# Patient Record
Sex: FEMALE | Race: WHITE | NOT HISPANIC OR LATINO | URBAN - METROPOLITAN AREA
[De-identification: names, ages, dates, MRNs, and addresses within clinical notes are randomized per-mention and may not be internally consistent; named-entity substitution may affect disease eponyms.]

---

## 2020-07-25 ENCOUNTER — TELEPHONE ENCOUNTER (OUTPATIENT)
Dept: URBAN - METROPOLITAN AREA CLINIC 13 | Facility: CLINIC | Age: 55
End: 2020-07-25

## 2020-07-25 RX ORDER — POLYETHYLENE GLYCOL 3350, SODIUM CHLORIDE, SODIUM BICARBONATE AND POTASSIUM CHLORIDE WITH LEMON FLAVOR 420; 11.2; 5.72; 1.48 G/4L; G/4L; G/4L; G/4L
USE AS DIRECTED POWDER, FOR SOLUTION ORAL
Qty: 1 | Refills: 0 | OUTPATIENT
Start: 2019-01-12 | End: 2019-01-14

## 2020-07-26 ENCOUNTER — TELEPHONE ENCOUNTER (OUTPATIENT)
Dept: URBAN - METROPOLITAN AREA CLINIC 13 | Facility: CLINIC | Age: 55
End: 2020-07-26

## 2020-07-26 RX ORDER — ESTRADIOL 0.75 MG/1.25G
GEL, METERED TOPICAL
Qty: 100 | Refills: 0 | Status: ACTIVE | COMMUNITY
Start: 2018-09-04

## 2020-07-26 RX ORDER — ALCAFTADINE 2.5 MG/ML
SOLUTION/ DROPS OPHTHALMIC
Qty: 3 | Refills: 0 | Status: ACTIVE | COMMUNITY
Start: 2018-12-06

## 2020-07-26 RX ORDER — SIMVASTATIN 40 MG/1
TAKE 1 TABLET AT BEDTIME TABLET, FILM COATED ORAL
Refills: 0 | Status: ACTIVE | COMMUNITY
Start: 2018-09-13

## 2020-07-26 RX ORDER — ESTRADIOL 1.53 MG/1
SPRAY TRANSDERMAL
Qty: 1 | Refills: 0 | Status: ACTIVE | COMMUNITY
Start: 2018-03-18

## 2020-07-26 RX ORDER — LOSARTAN POTASSIUM 100 MG/1
TABLET, FILM COATED ORAL
Qty: 30 | Refills: 0 | Status: ACTIVE | COMMUNITY
Start: 2018-09-13

## 2020-07-26 RX ORDER — DEXTROSE 4 G
TABLET,CHEWABLE ORAL
Qty: 90 | Refills: 0 | Status: ACTIVE | COMMUNITY
Start: 2018-09-13

## 2020-07-26 RX ORDER — CITALOPRAM 40 MG/1
TABLET ORAL
Qty: 30 | Refills: 0 | Status: ACTIVE | COMMUNITY
Start: 2017-12-26

## 2020-07-26 RX ORDER — PRASTERONE 6.5 MG/1
INSERT VAGINAL
Qty: 28 | Refills: 0 | Status: ACTIVE | COMMUNITY
Start: 2018-03-18

## 2020-07-26 RX ORDER — PREDNISONE 10 MG/1
TABLET ORAL
Qty: 10 | Refills: 0 | Status: ACTIVE | COMMUNITY
Start: 2018-11-09

## 2020-07-26 RX ORDER — HYDROCHLOROTHIAZIDE 12.5 MG/1
TABLET ORAL
Qty: 30 | Refills: 0 | Status: ACTIVE | COMMUNITY
Start: 2018-09-13

## 2020-07-26 RX ORDER — CITALOPRAM HYDROBROMIDE 10 MG/1
TAKE 3 TABLETS DAILY TABLET, FILM COATED ORAL
Refills: 0 | Status: ACTIVE | COMMUNITY
Start: 2018-03-18

## 2020-07-26 RX ORDER — BROMPHENIRAMINE MALEATE, PSEUDOEPHEDRINE HYDROCHLORIDE, 2; 30; 10 MG/5ML; MG/5ML; MG/5ML
SYRUP ORAL
Qty: 150 | Refills: 0 | Status: ACTIVE | COMMUNITY
Start: 2018-11-09

## 2020-07-26 RX ORDER — CIPROFLOXACIN HYDROCHLORIDE 250 MG/1
TABLET, FILM COATED ORAL
Qty: 10 | Refills: 0 | Status: ACTIVE | COMMUNITY
Start: 2018-03-07

## 2020-07-26 RX ORDER — PANTOPRAZOLE SODIUM 40 MG/1
TAKE 1 TABLET DAILY TABLET, DELAYED RELEASE ORAL
Refills: 11 | Status: ACTIVE | COMMUNITY
Start: 2018-09-13

## 2020-07-26 RX ORDER — IBUPROFEN 800 MG/1
TABLET ORAL
Qty: 30 | Refills: 0 | Status: ACTIVE | COMMUNITY
Start: 2018-09-13

## 2020-07-26 RX ORDER — LOSARTAN POTASSIUM 100 MG/1
TABLET, FILM COATED ORAL
Qty: 30 | Refills: 0 | Status: ACTIVE | COMMUNITY
Start: 2019-01-12

## 2020-07-26 RX ORDER — PHENTERMINE HYDROCHLORIDE 30 MG/1
TABLET ORAL
Qty: 30 | Refills: 0 | Status: ACTIVE | COMMUNITY
Start: 2018-09-19

## 2020-07-26 RX ORDER — ALPRAZOLAM 0.5 MG/1
TABLET ORAL
Qty: 30 | Refills: 0 | Status: ACTIVE | COMMUNITY
Start: 2018-10-01

## 2024-11-06 ENCOUNTER — OFFICE VISIT (OUTPATIENT)
Dept: URBAN - METROPOLITAN AREA CLINIC 113 | Facility: CLINIC | Age: 59
End: 2024-11-06

## 2025-01-10 ENCOUNTER — OFFICE VISIT (OUTPATIENT)
Dept: URBAN - METROPOLITAN AREA CLINIC 113 | Facility: CLINIC | Age: 60
End: 2025-01-10

## 2025-03-05 ENCOUNTER — OFFICE VISIT (OUTPATIENT)
Dept: URBAN - METROPOLITAN AREA CLINIC 113 | Facility: CLINIC | Age: 60
End: 2025-03-05

## 2025-03-24 ENCOUNTER — LAB OUTSIDE AN ENCOUNTER (OUTPATIENT)
Dept: URBAN - METROPOLITAN AREA CLINIC 113 | Facility: CLINIC | Age: 60
End: 2025-03-24

## 2025-03-24 ENCOUNTER — OFFICE VISIT (OUTPATIENT)
Dept: URBAN - METROPOLITAN AREA CLINIC 113 | Facility: CLINIC | Age: 60
End: 2025-03-24
Payer: COMMERCIAL

## 2025-03-24 ENCOUNTER — DASHBOARD ENCOUNTERS (OUTPATIENT)
Age: 60
End: 2025-03-24

## 2025-03-24 DIAGNOSIS — K83.1 COMMON BILE DUCT STRICTURE: ICD-10-CM

## 2025-03-24 DIAGNOSIS — R10.11 RUQ ABDOMINAL PAIN: ICD-10-CM

## 2025-03-24 PROBLEM — 43797002: Status: ACTIVE | Noted: 2025-03-24

## 2025-03-24 PROBLEM — 301717006: Status: ACTIVE | Noted: 2025-03-24

## 2025-03-24 PROCEDURE — 99204 OFFICE O/P NEW MOD 45 MIN: CPT | Performed by: NURSE PRACTITIONER

## 2025-03-24 RX ORDER — ALPRAZOLAM 0.5 MG/1
TABLET ORAL
Qty: 30 | Refills: 0 | Status: ON HOLD | COMMUNITY
Start: 2018-10-01

## 2025-03-24 RX ORDER — ESTRADIOL 1.53 MG/1
SPRAY TRANSDERMAL
Qty: 1 | Refills: 0 | Status: ON HOLD | COMMUNITY
Start: 2018-03-18

## 2025-03-24 RX ORDER — BUPROPION HYDROCHLORIDE 150 MG/1
1 TABLET IN THE MORNING TABLET, FILM COATED, EXTENDED RELEASE ORAL ONCE A DAY
Status: ACTIVE | COMMUNITY

## 2025-03-24 RX ORDER — LEVOTHYROXINE SODIUM 25 UG/1
1 TABLET IN THE MORNING ON AN EMPTY STOMACH TABLET ORAL ONCE A DAY
Status: ACTIVE | COMMUNITY

## 2025-03-24 RX ORDER — ROSUVASTATIN CALCIUM 20 MG/1
1 TABLET TABLET, FILM COATED ORAL ONCE A DAY
Status: ACTIVE | COMMUNITY

## 2025-03-24 RX ORDER — TESTOSTERONE 10 MG/.5G
1 PUMP TO SKIN IN THE MORNING TO THE THIGHS GEL, METERED TOPICAL ONCE A DAY
Status: ACTIVE | COMMUNITY

## 2025-03-24 RX ORDER — DEXTROSE 4 G
TABLET,CHEWABLE ORAL
Qty: 90 | Refills: 0 | Status: ON HOLD | COMMUNITY
Start: 2018-09-13

## 2025-03-24 RX ORDER — DIPHENHYDRAMINE HCL 25 MG/1
2 TABLETS TABLET, COATED ORAL
Qty: 2 | Refills: 0 | OUTPATIENT
Start: 2025-03-24 | End: 2025-03-25

## 2025-03-24 RX ORDER — PANTOPRAZOLE SODIUM 40 MG/1
TAKE 1 TABLET DAILY TABLET, DELAYED RELEASE ORAL
Refills: 11 | Status: ON HOLD | COMMUNITY
Start: 2018-09-13

## 2025-03-24 RX ORDER — ALPRAZOLAM 0.5 MG/1
1 TABLET TABLET ORAL TWICE A DAY
Status: ACTIVE | COMMUNITY

## 2025-03-24 RX ORDER — SIMVASTATIN 40 MG/1
TAKE 1 TABLET AT BEDTIME TABLET, FILM COATED ORAL
Refills: 0 | Status: ON HOLD | COMMUNITY
Start: 2018-09-13

## 2025-03-24 RX ORDER — ESTRADIOL 0.75 MG/1.25G
GEL, METERED TOPICAL
Qty: 100 | Refills: 0 | Status: ON HOLD | COMMUNITY
Start: 2018-09-04

## 2025-03-24 RX ORDER — PHENTERMINE HYDROCHLORIDE 30 MG/1
TABLET ORAL
Qty: 30 | Refills: 0 | Status: ON HOLD | COMMUNITY
Start: 2018-09-19

## 2025-03-24 RX ORDER — LOSARTAN POTASSIUM 100 MG/1
TABLET, FILM COATED ORAL
Qty: 30 | Refills: 0 | Status: ACTIVE | COMMUNITY
Start: 2018-09-13

## 2025-03-24 RX ORDER — HYDROCHLOROTHIAZIDE 12.5 MG/1
TABLET ORAL
Qty: 30 | Refills: 0 | Status: ON HOLD | COMMUNITY
Start: 2018-09-13

## 2025-03-24 RX ORDER — CITALOPRAM 40 MG/1
TABLET ORAL
Qty: 30 | Refills: 0 | Status: ON HOLD | COMMUNITY
Start: 2017-12-26

## 2025-03-24 RX ORDER — ESTRADIOL 1 MG/1
1 TABLET TABLET ORAL ONCE A DAY
Status: ACTIVE | COMMUNITY

## 2025-03-24 RX ORDER — CIPROFLOXACIN HYDROCHLORIDE 250 MG/1
TABLET, FILM COATED ORAL
Qty: 10 | Refills: 0 | Status: ON HOLD | COMMUNITY
Start: 2018-03-07

## 2025-03-24 RX ORDER — PANTOPRAZOLE SODIUM 40 MG/1
1 TABLET 1/2 TO 1 HOUR BEFORE MORNING MEAL TABLET, DELAYED RELEASE ORAL ONCE A DAY
Status: ACTIVE | COMMUNITY

## 2025-03-24 RX ORDER — CITALOPRAM HYDROBROMIDE 10 MG/1
TAKE 3 TABLETS DAILY TABLET, FILM COATED ORAL
Refills: 0 | Status: ON HOLD | COMMUNITY
Start: 2018-03-18

## 2025-03-24 RX ORDER — BROMPHENIRAMINE MALEATE, PSEUDOEPHEDRINE HYDROCHLORIDE, 2; 30; 10 MG/5ML; MG/5ML; MG/5ML
SYRUP ORAL
Qty: 150 | Refills: 0 | Status: ON HOLD | COMMUNITY
Start: 2018-11-09

## 2025-03-24 RX ORDER — PREDNISONE 10 MG/1
TABLET ORAL
Qty: 10 | Refills: 0 | Status: ON HOLD | COMMUNITY
Start: 2018-11-09

## 2025-03-24 RX ORDER — PRASTERONE 6.5 MG/1
INSERT VAGINAL
Qty: 28 | Refills: 0 | Status: ON HOLD | COMMUNITY
Start: 2018-03-18

## 2025-03-24 RX ORDER — ALCAFTADINE 2.5 MG/ML
SOLUTION/ DROPS OPHTHALMIC
Qty: 3 | Refills: 0 | Status: ON HOLD | COMMUNITY
Start: 2018-12-06

## 2025-03-24 RX ORDER — PREDNISONE 50 MG/1
1 TABLET TABLET ORAL
Qty: 3 | Refills: 0 | OUTPATIENT
Start: 2025-03-24

## 2025-03-24 RX ORDER — IBUPROFEN 800 MG/1
TABLET ORAL
Qty: 30 | Refills: 0 | Status: ON HOLD | COMMUNITY
Start: 2018-09-13

## 2025-03-24 NOTE — HPI-TODAY'S VISIT:
This is a 59-year-old female with a history of hypertension, anxiety/depression, hypothyroidism, up-to-date colon cancer screening due in 2029, presenting for evaluation of a "biliary tract problem." She was last seen in March 2011 for follow up regarding abdominal pain.  After reviewing her case, Dr. 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  She had originally presented with elevated liver enzymes, common bile duct dilation, and abdominal pain.  Since performing sphincterotomy, abdominal pain had improved from an 8/10 in intensity to 5/10.  Frequency of pain had decreased from every day to every other day.  CT with pancreatic protocol was to be performed in late June as well as a CMP.  It was discussed that treatment of functional abdominal pain with Elavil was complicating sphincter of Oddi dysfunction.  In February 2011, at a previous visit, she had episodes of right upper quadrant pain and had undergone ERCP with sphincterotomy after a CT showed mild intrahepatic dilation and distal common bile duct stricture.  Brushings were nondiagnostic and biopsies did not reveal any malignancy.  Sphincter of Oddi dysfunction versus functional abdominal pain or within the differential.  The plan was to continue monitoring with CT and labs.  She was prescribed amitriptyline 25 mg at bedtime.  She did not return for follow-up.  She reports recurrence of right upper quadrant pain over the last 1 or 2 years.  This occurs at random 2 or 3 times a week.  She denies postprandial pain.  It is severe for a few minutes and resolves without intervention.  It does not recur within a single day.  She has occasional pain in the low abdomen.  She is taking pantoprazole 40 mg daily.  Acid reflux is well-controlled.  She denies nausea.  She has normal bowel movements.  Once a week, she has constipation described as a hard stool.  Occasionally, she has an urgent postprandial bowel movement.  She takes colesevelam prophylactically if she is leaving her home.  She was on Wegovy for weight loss.  She stopped in January because she had a sinus infection.  While she was taking it, she had significant constipation.  She is soon starting Zepbound. She has been evaluated by Dr. Jovel in the past.  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  Labs at 1/20/2025 CBC:WBC 7.9, hemoglobin 13.5, MCV 87, platelet 294.  BMP normal with exception of calcium 8.6.  LFTs normal TB less than 0.2, ALP 80, ALT 15, AST 16.  Lipid panel normal with exception of cholesterol 208, triglycerides 158, .  A1c 5.7.  TSH 1.370.  She reports a CT scan at [facility] in March 2024.

## 2025-03-24 NOTE — HPI-OTHER HISTORIES
Labs at 1/20/2025 CBC:WBC 7.9, hemoglobin 13.5, MCV 87, platelet 294. BMP normal with exception of calcium 8.6. LFTs normal TB less than 0.2, ALP 80, ALT 15, AST 16. Lipid panel normal with exception of cholesterol 208, triglycerides 158, . A1c 5.7. TSH 1.370.

## 2025-03-26 ENCOUNTER — WEB ENCOUNTER (OUTPATIENT)
Dept: URBAN - METROPOLITAN AREA CLINIC 113 | Facility: CLINIC | Age: 60
End: 2025-03-26

## 2025-04-04 ENCOUNTER — WEB ENCOUNTER (OUTPATIENT)
Dept: URBAN - METROPOLITAN AREA CLINIC 113 | Facility: CLINIC | Age: 60
End: 2025-04-04

## 2025-04-04 ENCOUNTER — TELEPHONE ENCOUNTER (OUTPATIENT)
Dept: URBAN - METROPOLITAN AREA CLINIC 113 | Facility: CLINIC | Age: 60
End: 2025-04-04

## 2025-04-18 ENCOUNTER — WEB ENCOUNTER (OUTPATIENT)
Dept: URBAN - METROPOLITAN AREA CLINIC 113 | Facility: CLINIC | Age: 60
End: 2025-04-18

## 2025-04-23 ENCOUNTER — WEB ENCOUNTER (OUTPATIENT)
Dept: URBAN - METROPOLITAN AREA CLINIC 113 | Facility: CLINIC | Age: 60
End: 2025-04-23

## 2025-06-17 ENCOUNTER — OFFICE VISIT (OUTPATIENT)
Dept: URBAN - METROPOLITAN AREA CLINIC 107 | Facility: CLINIC | Age: 60
End: 2025-06-17
Payer: COMMERCIAL

## 2025-06-17 ENCOUNTER — LAB OUTSIDE AN ENCOUNTER (OUTPATIENT)
Dept: URBAN - METROPOLITAN AREA CLINIC 107 | Facility: CLINIC | Age: 60
End: 2025-06-17

## 2025-06-17 DIAGNOSIS — R10.11 RUQ ABDOMINAL PAIN: ICD-10-CM

## 2025-06-17 DIAGNOSIS — K83.1 COMMON BILE DUCT STRICTURE: ICD-10-CM

## 2025-06-17 PROCEDURE — 99214 OFFICE O/P EST MOD 30 MIN: CPT | Performed by: INTERNAL MEDICINE

## 2025-06-17 RX ORDER — DICYCLOMINE HYDROCHLORIDE 10 MG/1
1 TABLET CAPSULE ORAL
Qty: 60 | Refills: 3 | Status: ACTIVE | COMMUNITY
Start: 2025-05-29

## 2025-06-17 RX ORDER — SIMVASTATIN 40 MG/1
TAKE 1 TABLET AT BEDTIME TABLET, FILM COATED ORAL
Refills: 0 | Status: DISCONTINUED | COMMUNITY
Start: 2018-09-13

## 2025-06-17 RX ORDER — PREDNISONE 10 MG/1
TABLET ORAL
Qty: 10 | Refills: 0 | Status: DISCONTINUED | COMMUNITY
Start: 2018-11-09

## 2025-06-17 RX ORDER — BUPROPION HYDROCHLORIDE 150 MG/1
1 TABLET IN THE MORNING TABLET, FILM COATED, EXTENDED RELEASE ORAL ONCE A DAY
Status: DISCONTINUED | COMMUNITY

## 2025-06-17 RX ORDER — PANTOPRAZOLE SODIUM 40 MG/1
TAKE 1 TABLET DAILY TABLET, DELAYED RELEASE ORAL
Refills: 11 | Status: DISCONTINUED | COMMUNITY
Start: 2018-09-13

## 2025-06-17 RX ORDER — TESTOSTERONE 10 MG/.5G
1 PUMP TO SKIN IN THE MORNING TO THE THIGHS GEL, METERED TOPICAL ONCE A DAY
Status: ACTIVE | COMMUNITY

## 2025-06-17 RX ORDER — POLYETHYLENE GLYCOL 3350 17 G/DOSE
1 SCOOP MIXED WITH 8 OUNCES OF FLUID POWDER (GRAM) ORAL ONCE A DAY
Status: ACTIVE | COMMUNITY

## 2025-06-17 RX ORDER — CYCLOBENZAPRINE HYDROCHLORIDE 7.5 MG/1
1 TABLET AT BEDTIME TABLET, FILM COATED ORAL ONCE A DAY
Status: ACTIVE | COMMUNITY

## 2025-06-17 RX ORDER — DILTIAZEM HYDROCHLORIDE EXTENDED-RELEASE TABLETS 180 MG/1
1 TABLET TABLET, EXTENDED RELEASE ORAL ONCE A DAY
Status: ACTIVE | COMMUNITY

## 2025-06-17 RX ORDER — LEVOTHYROXINE SODIUM 25 UG/1
1 TABLET IN THE MORNING ON AN EMPTY STOMACH TABLET ORAL ONCE A DAY
Status: ACTIVE | COMMUNITY

## 2025-06-17 RX ORDER — DEXTROSE 4 G
TABLET,CHEWABLE ORAL
Qty: 90 | Refills: 0 | Status: DISCONTINUED | COMMUNITY
Start: 2018-09-13

## 2025-06-17 RX ORDER — DOCUSATE SODIUM 100 MG/1
1 CAPSULE AS NEEDED CAPSULE ORAL ONCE A DAY
Status: ACTIVE | COMMUNITY

## 2025-06-17 RX ORDER — IBUPROFEN 800 MG/1
TABLET ORAL
Qty: 30 | Refills: 0 | Status: DISCONTINUED | COMMUNITY
Start: 2018-09-13

## 2025-06-17 RX ORDER — CIPROFLOXACIN HYDROCHLORIDE 250 MG/1
TABLET, FILM COATED ORAL
Qty: 10 | Refills: 0 | Status: DISCONTINUED | COMMUNITY
Start: 2018-03-07

## 2025-06-17 RX ORDER — HYDROCHLOROTHIAZIDE 12.5 MG/1
TABLET ORAL
Qty: 30 | Refills: 0 | Status: DISCONTINUED | COMMUNITY
Start: 2018-09-13

## 2025-06-17 RX ORDER — ALPRAZOLAM 0.5 MG/1
1 TABLET TABLET ORAL TWICE A DAY
Status: ACTIVE | COMMUNITY

## 2025-06-17 RX ORDER — PREDNISONE 50 MG/1
1 TABLET TABLET ORAL
Qty: 3 | Refills: 0 | Status: DISCONTINUED | COMMUNITY
Start: 2025-03-24

## 2025-06-17 RX ORDER — PANTOPRAZOLE SODIUM 40 MG/1
1 TABLET 1/2 TO 1 HOUR BEFORE MORNING MEAL TABLET, DELAYED RELEASE ORAL ONCE A DAY
Status: ACTIVE | COMMUNITY

## 2025-06-17 RX ORDER — ALPRAZOLAM 0.5 MG/1
TABLET ORAL
Qty: 30 | Refills: 0 | Status: DISCONTINUED | COMMUNITY
Start: 2018-10-01

## 2025-06-17 RX ORDER — PRASTERONE 6.5 MG/1
INSERT VAGINAL
Qty: 28 | Refills: 0 | Status: DISCONTINUED | COMMUNITY
Start: 2018-03-18

## 2025-06-17 RX ORDER — LOSARTAN POTASSIUM 50 MG/1
1 TABLET TABLET, FILM COATED ORAL ONCE A DAY
Refills: 0 | Status: ACTIVE | COMMUNITY
Start: 2018-09-13

## 2025-06-17 RX ORDER — CITALOPRAM HYDROBROMIDE 10 MG/1
TAKE 3 TABLETS DAILY TABLET, FILM COATED ORAL
Refills: 0 | Status: DISCONTINUED | COMMUNITY
Start: 2018-03-18

## 2025-06-17 RX ORDER — CITALOPRAM 40 MG/1
TABLET ORAL
Qty: 30 | Refills: 0 | Status: DISCONTINUED | COMMUNITY
Start: 2017-12-26

## 2025-06-17 RX ORDER — TIRZEPATIDE 2.5 MG/.5ML
0.5 ML INJECTION, SOLUTION SUBCUTANEOUS
Status: ACTIVE | COMMUNITY

## 2025-06-17 RX ORDER — ESTRADIOL 1.53 MG/1
SPRAY TRANSDERMAL
Qty: 1 | Refills: 0 | Status: DISCONTINUED | COMMUNITY
Start: 2018-03-18

## 2025-06-17 RX ORDER — ALCAFTADINE 2.5 MG/ML
SOLUTION/ DROPS OPHTHALMIC
Qty: 3 | Refills: 0 | Status: DISCONTINUED | COMMUNITY
Start: 2018-12-06

## 2025-06-17 RX ORDER — ROSUVASTATIN CALCIUM 20 MG/1
1 TABLET TABLET, FILM COATED ORAL ONCE A DAY
Status: ACTIVE | COMMUNITY

## 2025-06-17 RX ORDER — BROMPHENIRAMINE MALEATE, PSEUDOEPHEDRINE HYDROCHLORIDE, 2; 30; 10 MG/5ML; MG/5ML; MG/5ML
SYRUP ORAL
Qty: 150 | Refills: 0 | Status: DISCONTINUED | COMMUNITY
Start: 2018-11-09

## 2025-06-17 RX ORDER — ESTRADIOL 1 MG/1
1 TABLET TABLET ORAL ONCE A DAY
Status: ACTIVE | COMMUNITY

## 2025-06-17 RX ORDER — ESTRADIOL 0.75 MG/1.25G
GEL, METERED TOPICAL
Qty: 100 | Refills: 0 | Status: DISCONTINUED | COMMUNITY
Start: 2018-09-04

## 2025-06-17 RX ORDER — PHENTERMINE HYDROCHLORIDE 30 MG/1
TABLET ORAL
Qty: 30 | Refills: 0 | Status: DISCONTINUED | COMMUNITY
Start: 2018-09-19

## 2025-06-17 NOTE — HPI-TODAY'S VISIT:
This is a 59-year-old female with a history of hypertension, anxiety/depression, hypothyroidism, up-to-date colon cancer screening due in 2029, presenting for follow up evaluation of a "biliary tract problem." She was last seen here on 3/24/25 by Michelle Burdick.   She was last seen in March 2011 for follow up regarding abdominal pain.  After reviewing her case, Dr. 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  She had originally presented with elevated liver enzymes, common bile duct dilation, and abdominal pain.  Since performing sphincterotomy, abdominal pain had improved from an 8/10 in intensity to 5/10.  Frequency of pain had decreased from every day to every other day.  CT with pancreatic protocol was to be performed in late June 2021 as well as a CMP.  It was discussed that treatment of functional abdominal pain with Elavil was complicating sphincter of Oddi dysfunction.  In February 2011, at a previous visit, she had episodes of right upper quadrant pain and had undergone ERCP with sphincterotomy after a CT showed mild intrahepatic dilation and distal common bile duct stricture.  Brushings were nondiagnostic and biopsies did not reveal any malignancy.  Sphincter of Oddi dysfunction versus functional abdominal pain was within the differential.  The plan was to continue monitoring with CT and labs.  She was prescribed amitriptyline 25 mg at bedtime.  She did not return for follow-up. Pain eventualy resolved.  She reports recurrence of right upper quadrant pain over the last 1 or 2 years.  This occurs at random 2 or 3 times a week.  She denies postprandial pain.  It is severe for a few minutes and resolves without intervention.  It does not recur within a single day.  She has occasional pain in the low abdomen.  She is taking pantoprazole 40 mg daily.  Acid reflux is well-controlled.  She denies nausea.  She has normal bowel movements.  Once a week, she has constipation described as a hard stool.  Occasionally, she has an urgent postprandial bowel movement.  She takes colesevelam prophylactically if she is leaving her home.  She was on Wegovy for weight loss.  She stopped in January because she had a sinus infection.  While she was taking it, she had significant constipation.  She is soon starting Zepbound.  She has been evaluated by Dr. Armas in the past.  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  Labs at 1/20/2025 CBC:WBC 7.9, hemoglobin 13.5, MCV 87, platelet 294.  BMP normal with exception of calcium 8.6.  LFTs normal TB less than 0.2, ALP 80, ALT 15, AST 16.  Lipid panel normal with exception of cholesterol 208, triglycerides 158, .  A1c 5.7.  TSH 1.370.  She reports a CT scan at [facility] in March 2024.  The patient continues to have intermittent right upper quadrant abdominal pain which can occur at random.  She describes it as a squeezing or spastic type pain which can last all day and is occurring once or twice a week at times.  She denies any associated fever, chills, or sweats.  She had labs done after her last office visit notable for a white blood cell count of 6500, hemoglobin 13.6, hematocrit of 41.9% and a platelet count of 262,000.  Alkaline phosphatase of 83, AST 18, ALT 18 and total bilirubin 0.2.  CT scan done on April 3, 2025 was normal except for some hardware in the L5-S1 area consistent with prior surgery.  The patient continues to have the same symptoms that she described at her last office visit in March.

## 2025-06-17 NOTE — EXAM-PHYSICAL EXAM
General- no acute distress Eyes- anicteric HENT- normocephalic, atraumatic head Neck- no lymphadenopathy Chest- normal breath sounds Heart- regular rate and rhythm Abdomen- soft, non tender, non distended, bowel sounds present Musculoskeletal- normal gait and station Skin- no rashes Neurologic- Alert and oriented x 3 Psychiatric- stable mood, appropriate affect

## 2025-07-03 ENCOUNTER — OFFICE VISIT (OUTPATIENT)
Dept: URBAN - METROPOLITAN AREA MEDICAL CENTER 19 | Facility: MEDICAL CENTER | Age: 60
End: 2025-07-03
Payer: COMMERCIAL

## 2025-07-03 DIAGNOSIS — K83.8 OTHER SPECIFIED DISEASES OF BILIARY TRACT: ICD-10-CM

## 2025-07-03 DIAGNOSIS — K31.7 BENIGN GASTRIC POLYP: ICD-10-CM

## 2025-07-03 DIAGNOSIS — K83.1 AMPULLA OF VATER OBSTRUCTION SYNDROME: ICD-10-CM

## 2025-07-03 DIAGNOSIS — K86.89 OTHER SPECIFIED DISEASES OF PANCREAS: ICD-10-CM

## 2025-07-03 PROCEDURE — 43259 EGD US EXAM DUODENUM/JEJUNUM: CPT | Performed by: INTERNAL MEDICINE

## 2025-07-03 PROCEDURE — 43239 EGD BIOPSY SINGLE/MULTIPLE: CPT | Performed by: INTERNAL MEDICINE

## 2025-07-03 PROCEDURE — 43264 ERCP REMOVE DUCT CALCULI: CPT | Performed by: INTERNAL MEDICINE

## 2025-07-03 PROCEDURE — 74328 X-RAY BILE DUCT ENDOSCOPY: CPT | Performed by: INTERNAL MEDICINE

## 2025-07-03 PROCEDURE — 43262 ENDO CHOLANGIOPANCREATOGRAPH: CPT | Performed by: INTERNAL MEDICINE

## 2025-07-03 RX ORDER — DILTIAZEM HYDROCHLORIDE EXTENDED-RELEASE TABLETS 180 MG/1
1 TABLET TABLET, EXTENDED RELEASE ORAL ONCE A DAY
Status: ACTIVE | COMMUNITY

## 2025-07-03 RX ORDER — DICYCLOMINE HYDROCHLORIDE 10 MG/1
1 TABLET CAPSULE ORAL
Qty: 60 | Refills: 3 | Status: ACTIVE | COMMUNITY
Start: 2025-05-29

## 2025-07-03 RX ORDER — POLYETHYLENE GLYCOL 3350 17 G/DOSE
1 SCOOP MIXED WITH 8 OUNCES OF FLUID POWDER (GRAM) ORAL ONCE A DAY
Status: ACTIVE | COMMUNITY

## 2025-07-03 RX ORDER — ROSUVASTATIN CALCIUM 20 MG/1
1 TABLET TABLET, FILM COATED ORAL ONCE A DAY
Status: ACTIVE | COMMUNITY

## 2025-07-03 RX ORDER — TIRZEPATIDE 2.5 MG/.5ML
0.5 ML INJECTION, SOLUTION SUBCUTANEOUS
Status: ACTIVE | COMMUNITY

## 2025-07-03 RX ORDER — LOSARTAN POTASSIUM 50 MG/1
1 TABLET TABLET, FILM COATED ORAL ONCE A DAY
Refills: 0 | Status: ACTIVE | COMMUNITY
Start: 2018-09-13

## 2025-07-03 RX ORDER — PANTOPRAZOLE SODIUM 40 MG/1
1 TABLET 1/2 TO 1 HOUR BEFORE MORNING MEAL TABLET, DELAYED RELEASE ORAL ONCE A DAY
Status: ACTIVE | COMMUNITY

## 2025-07-03 RX ORDER — TESTOSTERONE 10 MG/.5G
1 PUMP TO SKIN IN THE MORNING TO THE THIGHS GEL, METERED TOPICAL ONCE A DAY
Status: ACTIVE | COMMUNITY

## 2025-07-03 RX ORDER — ESTRADIOL 1 MG/1
1 TABLET TABLET ORAL ONCE A DAY
Status: ACTIVE | COMMUNITY

## 2025-07-03 RX ORDER — LEVOTHYROXINE SODIUM 25 UG/1
1 TABLET IN THE MORNING ON AN EMPTY STOMACH TABLET ORAL ONCE A DAY
Status: ACTIVE | COMMUNITY

## 2025-07-03 RX ORDER — CYCLOBENZAPRINE HYDROCHLORIDE 7.5 MG/1
1 TABLET AT BEDTIME TABLET, FILM COATED ORAL ONCE A DAY
Status: ACTIVE | COMMUNITY

## 2025-07-03 RX ORDER — ALPRAZOLAM 0.5 MG/1
1 TABLET TABLET ORAL TWICE A DAY
Status: ACTIVE | COMMUNITY

## 2025-07-03 RX ORDER — DOCUSATE SODIUM 100 MG/1
1 CAPSULE AS NEEDED CAPSULE ORAL ONCE A DAY
Status: ACTIVE | COMMUNITY

## 2025-07-09 ENCOUNTER — OFFICE VISIT (OUTPATIENT)
Dept: URBAN - METROPOLITAN AREA CLINIC 107 | Facility: CLINIC | Age: 60
End: 2025-07-09
Payer: COMMERCIAL

## 2025-07-09 DIAGNOSIS — K83.1 COMMON BILE DUCT STRICTURE: ICD-10-CM

## 2025-07-09 DIAGNOSIS — R10.11 RUQ ABDOMINAL PAIN: ICD-10-CM

## 2025-07-09 PROCEDURE — 99214 OFFICE O/P EST MOD 30 MIN: CPT | Performed by: INTERNAL MEDICINE

## 2025-07-09 RX ORDER — DOCUSATE SODIUM 100 MG/1
1 CAPSULE AS NEEDED CAPSULE ORAL ONCE A DAY
Status: ACTIVE | COMMUNITY

## 2025-07-09 RX ORDER — LEVOTHYROXINE SODIUM 25 UG/1
1 TABLET IN THE MORNING ON AN EMPTY STOMACH TABLET ORAL ONCE A DAY
Status: ACTIVE | COMMUNITY

## 2025-07-09 RX ORDER — ALPRAZOLAM 0.5 MG/1
1 TABLET TABLET ORAL TWICE A DAY
Status: ACTIVE | COMMUNITY

## 2025-07-09 RX ORDER — TESTOSTERONE 10 MG/.5G
1 PUMP TO SKIN IN THE MORNING TO THE THIGHS GEL, METERED TOPICAL ONCE A DAY
Status: ACTIVE | COMMUNITY

## 2025-07-09 RX ORDER — TIRZEPATIDE 2.5 MG/.5ML
0.5 ML INJECTION, SOLUTION SUBCUTANEOUS
Status: ACTIVE | COMMUNITY

## 2025-07-09 RX ORDER — CYCLOBENZAPRINE HYDROCHLORIDE 7.5 MG/1
1 TABLET AT BEDTIME TABLET, FILM COATED ORAL ONCE A DAY
Status: ACTIVE | COMMUNITY

## 2025-07-09 RX ORDER — POLYETHYLENE GLYCOL 3350 17 G/DOSE
1 SCOOP MIXED WITH 8 OUNCES OF FLUID POWDER (GRAM) ORAL ONCE A DAY
Status: ACTIVE | COMMUNITY

## 2025-07-09 RX ORDER — ROSUVASTATIN CALCIUM 20 MG/1
1 TABLET TABLET, FILM COATED ORAL ONCE A DAY
Status: ACTIVE | COMMUNITY

## 2025-07-09 RX ORDER — DILTIAZEM HYDROCHLORIDE EXTENDED-RELEASE TABLETS 180 MG/1
1 TABLET TABLET, EXTENDED RELEASE ORAL ONCE A DAY
Status: ACTIVE | COMMUNITY

## 2025-07-09 RX ORDER — PANTOPRAZOLE SODIUM 40 MG/1
1 TABLET 1/2 TO 1 HOUR BEFORE MORNING MEAL TABLET, DELAYED RELEASE ORAL ONCE A DAY
Status: ACTIVE | COMMUNITY

## 2025-07-09 RX ORDER — DICYCLOMINE HYDROCHLORIDE 10 MG/1
1 TABLET CAPSULE ORAL
Qty: 60 | Refills: 3 | Status: ACTIVE | COMMUNITY
Start: 2025-05-29

## 2025-07-09 RX ORDER — ESTRADIOL 1 MG/1
1 TABLET TABLET ORAL ONCE A DAY
Status: ACTIVE | COMMUNITY

## 2025-07-09 RX ORDER — LOSARTAN POTASSIUM 50 MG/1
1 TABLET TABLET, FILM COATED ORAL ONCE A DAY
Refills: 0 | Status: ACTIVE | COMMUNITY
Start: 2018-09-13

## 2025-07-09 NOTE — HPI-TODAY'S VISIT:
This is a 59-year-old female with a history of hypertension, anxiety/depression, hypothyroidism, up-to-date colon cancer screening due in 2029, presenting for follow up evaluation of a "biliary tract problem." She was last seen here on 6/17/25.  She was seen in March 2011 for follow up regarding abdominal pain.  After reviewing her case, Dr. 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  She had originally presented with elevated liver enzymes, common bile duct dilation, and abdominal pain. After he performed sphincterotomy, abdominal pain improved from an 8/10 in intensity to 5/10.  Frequency of pain had decreased from every day to every other day.  CT with pancreatic protocol was to be performed in late June 2011 as well as a CMP.  It was discussed that treatment of functional abdominal pain with Elavil was complicating sphincter of Oddi dysfunction.  In February 2011, at a previous visit, she had episodes of right upper quadrant pain and had undergone ERCP with sphincterotomy after a CT showed mild intrahepatic dilation and distal common bile duct stricture.  Brushings were nondiagnostic and biopsies did not reveal any malignancy.  Sphincter of Oddi dysfunction versus functional abdominal pain was within the differential.  The plan was to continue monitoring with CT and labs.  She was prescribed amitriptyline 25 mg at bedtime.  She did not return for follow-up. Pain eventualy resolved.  She reports recurrence of right upper quadrant pain over the last 1 or 2 years.  This occurs at random 2 or 3 times a week.  She denies postprandial pain.  It is severe for a few minutes and resolves without intervention.  It does not recur within a single day.  She has occasional pain in the low abdomen.  She is taking pantoprazole 40 mg daily.  Acid reflux is well-controlled.  She denies nausea.  She has normal bowel movements.  Once a week, she has constipation described as a hard stool.  Occasionally, she has an urgent postprandial bowel movement.  She takes colesevelam prophylactically if she is leaving her home.  She was on Wegovy for weight loss.  She stopped in January because she had a sinus infection.  While she was taking it, she had significant constipation.  She is soon starting Zepbound.  She has been evaluated by Dr. Armas in the past.  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  Labs at 1/20/2025 CBC:WBC 7.9, hemoglobin 13.5, MCV 87, platelet 294.  BMP normal with exception of calcium 8.6.  LFTs normal TB less than 0.2, ALP 80, ALT 15, AST 16.  Lipid panel normal with exception of cholesterol 208, triglycerides 158, .  A1c 5.7.  TSH 1.370.  She reports a CT scan at [facility] in March 2024.  The patient continues to have intermittent right upper quadrant abdominal pain which can occur at random.  She describes it as a squeezing or spastic type pain which can last all day and is occurring once or twice a week at times.  She denies any associated fever, chills, or sweats.  She had labs done after her last office visit notable for a white blood cell count of 6500, hemoglobin 13.6, hematocrit of 41.9% and a platelet count of 262,000.  Alkaline phosphatase of 83, AST 18, ALT 18 and total bilirubin 0.2.  CT scan done on April 3, 2025 was normal except for some hardware in the L5-S1 area consistent with prior surgery.  After her last office visit, the patient was felt to possibly have a recurrence of her prior pathology.  She was scheduled for EUS/ERCP and underwent this on July 5, 2025.  There was no stricture or filling defect found, but her sphincterotomy was noted to have stenosed.  Dr. Howard performed a repeat sphincterotomy.  The patient returns today for follow-up.  She has had no symptoms of abdominal pain since her procedure and is doing well overall.

## 2025-07-10 ENCOUNTER — TELEPHONE ENCOUNTER (OUTPATIENT)
Dept: URBAN - METROPOLITAN AREA CLINIC 113 | Facility: CLINIC | Age: 60
End: 2025-07-10